# Patient Record
Sex: FEMALE | Race: WHITE | NOT HISPANIC OR LATINO | Employment: FULL TIME | URBAN - METROPOLITAN AREA
[De-identification: names, ages, dates, MRNs, and addresses within clinical notes are randomized per-mention and may not be internally consistent; named-entity substitution may affect disease eponyms.]

---
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
## 2021-07-15 ENCOUNTER — IMMUNIZATION (OUTPATIENT)
Dept: VACCINE CLINIC | Facility: HOSPITAL | Age: 21
End: 2021-07-15

## 2021-07-15 PROCEDURE — 91300 HC SARSCOV02 VAC 30MCG/0.3ML IM: CPT | Performed by: INTERNAL MEDICINE

## 2021-07-15 PROCEDURE — 0001A: CPT | Performed by: INTERNAL MEDICINE

## 2021-08-05 ENCOUNTER — IMMUNIZATION (OUTPATIENT)
Dept: VACCINE CLINIC | Facility: HOSPITAL | Age: 21
End: 2021-08-05

## 2021-08-05 PROCEDURE — 91300 HC SARSCOV02 VAC 30MCG/0.3ML IM: CPT | Performed by: INTERNAL MEDICINE

## 2021-08-05 PROCEDURE — 0002A: CPT | Performed by: INTERNAL MEDICINE

## 2021-08-18 PROBLEM — R55 VASOVAGAL SYNCOPE: Status: ACTIVE | Noted: 2021-03-10

## 2021-08-18 PROBLEM — N89.8 VAGINAL DISCHARGE: Status: ACTIVE | Noted: 2018-04-17

## 2021-08-18 PROBLEM — N91.2 AMENORRHEA: Status: ACTIVE | Noted: 2018-04-17

## 2021-08-18 RX ORDER — ONDANSETRON 4 MG/1
4 TABLET, ORALLY DISINTEGRATING ORAL
COMMUNITY
Start: 2021-03-11 | End: 2021-08-19

## 2021-08-18 RX ORDER — SERTRALINE HYDROCHLORIDE 25 MG/1
25 TABLET, FILM COATED ORAL DAILY
COMMUNITY

## 2021-08-19 ENCOUNTER — OFFICE VISIT (OUTPATIENT)
Dept: ENDOCRINOLOGY | Facility: CLINIC | Age: 21
End: 2021-08-19

## 2021-08-19 VITALS
TEMPERATURE: 96.9 F | DIASTOLIC BLOOD PRESSURE: 68 MMHG | OXYGEN SATURATION: 98 % | WEIGHT: 181 LBS | SYSTOLIC BLOOD PRESSURE: 100 MMHG | HEIGHT: 64 IN | BODY MASS INDEX: 30.9 KG/M2 | HEART RATE: 71 BPM

## 2021-08-19 DIAGNOSIS — E55.9 VITAMIN D DEFICIENCY: ICD-10-CM

## 2021-08-19 DIAGNOSIS — R94.6 ABNORMAL THYROID FUNCTION TEST: Primary | ICD-10-CM

## 2021-08-19 DIAGNOSIS — R53.83 OTHER FATIGUE: ICD-10-CM

## 2021-08-19 PROCEDURE — 99244 OFF/OP CNSLTJ NEW/EST MOD 40: CPT | Performed by: INTERNAL MEDICINE

## 2021-08-19 RX ORDER — CHOLECALCIFEROL (VITAMIN D3) 125 MCG
CAPSULE ORAL
COMMUNITY
End: 2021-08-19

## 2021-08-19 NOTE — PROGRESS NOTES
Endocrine Consult Outpatient  Referred by Dr. Vanessa Biswas for abnormal thyroid function tests  Patient Care Team:  Vanessa Biswas MD as PCP - General (Pulmonary Disease)     Chief Complaint: Abnormal thyroid function tests        HPI: This is a 21-year-old female with history of vitamin D deficiency was found to have elevated total T4 with normal TSH is referred here for further evaluation and management.  He does admit fatigue and tiredness.  She does admit some occasional tremors and palpitations along with insomnia as well as mood swings.  She also have headaches and heavy menstrual cycles.  She also have anxiety issues.  She is not taking any thyroid medications and has not taken any thyroid in the past.  Vitamin D deficiency: She is supposed to be on vitamin D supplementation was on 2000 units a day but has not been taking it recently.    She has been having these episodes where she passes out and has been labeled as vasovagal episodes.  She does have low blood pressure and those episodes.  She does have fatigue with some random nausea and has lost some weight.    Data reviewed: Labs from February 5 of 2021 showed a serum sodium of 138, potassium 4.2, chloride 104, CO2 22, glucose 80, BUN 9, creatinine 0.6, AST was 15 with ALT 10, TSH 1.36, total T4 was elevated at 14.8, free T4 index was mild high at 4.1  White count 10.8, hemoglobin 12.3 with hematocrit 37.3 and platelet count was 229  Vitamin D level was low at 9.  A thyroid ultrasound was done back in March 2021 showed normal thyroid ultrasound.  No nodules were seen.    Past Medical History:   Diagnosis Date   • Anxiety    • Depression    • Hyperlipidemia    • Macromastia        Social History     Socioeconomic History   • Marital status: Single     Spouse name: Not on file   • Number of children: Not on file   • Years of education: Not on file   • Highest education level: Not on file   Tobacco Use   • Smoking status: Never Smoker   • Smokeless tobacco:  Never Used   Vaping Use   • Vaping Use: Never used   Substance and Sexual Activity   • Alcohol use: No   • Drug use: Yes     Types: Marijuana   • Sexual activity: Defer       Family History   Problem Relation Age of Onset   • Supraventricular tachycardia Mother    • Bradycardia Father    • Supraventricular tachycardia Sister    • Diabetes Brother    • Diabetes Maternal Aunt    • Diabetes Maternal Uncle    • Diabetes Maternal Grandmother    • Malig Hyperthermia Neg Hx        No Known Allergies    ROS:   Constitutional:  Admit fatigue, tiredness.    Eyes:  Denies change in visual acuity   HENT:  Denies nasal congestion or sore throat   Respiratory: denies cough, shortness of breath.   Cardiovascular:  denies chest pain, edema   GI:  Denies abdominal pain, nausea, vomiting.    :  Denies dysuria   Musculoskeletal:  Denies back pain or joint pain   Integument:  Denies dry skin, rash   Neurologic:  Denies headache, focal weakness or sensory changes   Endocrine:  Denies polyuria or polydipsia   Psychiatric:  Denies depression or anxiety      Vitals:    08/19/21 1408   BP: 100/68   Pulse: 71   Temp: 96.9 °F (36.1 °C)   SpO2: 98%     Body mass index is 31.07 kg/m².      Physical Exam:  GEN: NAD, conversant, obese  EYES: EOMI, PERRL, no conjunctival erythema  NECK: no thyromegaly, full ROM   CV: RRR, no murmurs/rubs/gallops, no peripheral edema  LUNG: CTAB, no wheezes/rales/ronchi  SKIN: no rashes, no acanthosis  MSK: no deformities, full ROM of all extremities  NEURO: no tremors, DTR normal  PSYCH: AOX3, appropriate mood, affect normal      Results Review:     I reviewed the patient's new clinical results.      Medication Review: Reviewed.       Current Outpatient Medications:   •  Cholecalciferol (Vitamin D3) 50 MCG (2000 UT) tablet, Take  by mouth., Disp: , Rfl:   •  Loratadine (CLARITIN) 10 MG capsule, Take 1 tablet by mouth Daily., Disp: , Rfl:   •  Melatonin-Pyridoxine (MELATIN PO), Take  by mouth., Disp: , Rfl:   •   sertraline (ZOLOFT) 25 MG tablet, Take 25 mg by mouth Daily., Disp: , Rfl:     Assessment/Plan   1.  Abnormal thyroid function test: This is a 21-year-old female was found to have normal TSH with elevated total T4.  She is not on birth control pills.  I will recheck TSH with free T4 and TBG level.  2.  Vitamin D deficiency: Level is very low, I will change vitamin D to 5000 units p.o. daily.  3.  Fatigue and tiredness: She has been having some syncopal episode, will check ACTH stim test.           Benny Manriquez MD FACE.

## 2021-08-19 NOTE — PATIENT INSTRUCTIONS
Change vitamin D to 5000 units p.o. daily  Check labs in the next few days including TSH, free T4, TBG level as well as ACTH stim test.

## (undated) DEVICE — 3M™ STERI-STRIP™ REINFORCED ADHESIVE SKIN CLOSURES, R1547, 1/2 IN X 4 IN (12 MM X 100 MM), 6 STRIPS/ENVELOPE: Brand: 3M™ STERI-STRIP™

## (undated) DEVICE — INTENDED FOR TISSUE SEPARATION, AND OTHER PROCEDURES THAT REQUIRE A SHARP SURGICAL BLADE TO PUNCTURE OR CUT.: Brand: BARD-PARKER ® CARBON RIB-BACK BLADES

## (undated) DEVICE — MARKR SKIN W/RULR AND LBL

## (undated) DEVICE — DRSNG SURESITE WNDW 4X4.5

## (undated) DEVICE — Device

## (undated) DEVICE — PENCL E/S HNDSWTCH SMOKEEVAC HOLSTR 10FT

## (undated) DEVICE — PK UNIV COMPL 40

## (undated) DEVICE — ELECTRD BLD EDGE/INSUL1P 2.4X5.1MM STRL

## (undated) DEVICE — TOTAL TRAY, 16FR 10ML SIL FOLEY, URN: Brand: MEDLINE

## (undated) DEVICE — SUT MNCRYL 3/0 PS2 18IN MCP497G

## (undated) DEVICE — GLV SURG TRIUMPH CLASSIC PF LTX 6.5 STRL

## (undated) DEVICE — PROXIMATE RH ROTATING HEAD SKIN STAPLERS (35 WIDE) CONTAINS 35 STAINLESS STEEL STAPLES: Brand: PROXIMATE

## (undated) DEVICE — SPNG GZ WOVN 4X4IN 12PLY 10/BX STRL

## (undated) DEVICE — SUT ETHLN 4/0 PS2 PLSTC 1667G

## (undated) DEVICE — GLV SURG BIOGEL M LTX PF 6 1/2

## (undated) DEVICE — PAD,ABDOMINAL,8"X10",ST,LF: Brand: MEDLINE

## (undated) DEVICE — BIOPATCH™ ANTIMICROBIAL DRESSING WITH CHLORHEXIDINE GLUCONATE IS A HYDROPHILLIC POLYURETHANE ABSORPTIVE FOAM WITH CHLORHEXIDINE GLUCONATE (CHG) WHICH INHIBITS BACTERIAL GROWTH UNDER THE DRESSING. THE DRESSING IS INTENDED TO BE USED TO ABSORB EXUDATE, COVER A WOUND CAUSED BY VASCULAR AND NONVASCULAR PERCUTANEOUS MEDICAL DEVICES DURING SURGERY, AS WELL AS REDUCE LOCAL INFECTION AND COLONIZATION OF MICROORGANISMS.: Brand: BIOPATCH

## (undated) DEVICE — BNDG ELAS ELITE V/CLOSE 6IN 5YD LF STRL

## (undated) DEVICE — SPNG LAP 18X18IN LF STRL PK/5

## (undated) DEVICE — STPLR SKIN SUBCUTICULAR INSORB 2030

## (undated) DEVICE — EXTRCT STPL SKIN STRL BX/12

## (undated) DEVICE — IRRIGATOR BULB ASEPTO 60CC STRL

## (undated) DEVICE — JACKSON-PRATT 100CC BULB RESERVOIR: Brand: CARDINAL HEALTH